# Patient Record
Sex: MALE | Race: WHITE | NOT HISPANIC OR LATINO
[De-identification: names, ages, dates, MRNs, and addresses within clinical notes are randomized per-mention and may not be internally consistent; named-entity substitution may affect disease eponyms.]

---

## 2021-09-13 ENCOUNTER — DASHBOARD ENCOUNTERS (OUTPATIENT)
Age: 86
End: 2021-09-13

## 2021-09-23 ENCOUNTER — OFFICE VISIT (OUTPATIENT)
Dept: URBAN - METROPOLITAN AREA CLINIC 54 | Facility: CLINIC | Age: 86
End: 2021-09-23
Payer: MEDICARE

## 2021-09-23 DIAGNOSIS — D30.00 ONCOCYTOMA DETERMINED BY BIOPSY OF KIDNEY: ICD-10-CM

## 2021-09-23 DIAGNOSIS — R14.2 BELCHING SYMPTOM: ICD-10-CM

## 2021-09-23 DIAGNOSIS — R94.5 ABNORMAL LFTS: ICD-10-CM

## 2021-09-23 DIAGNOSIS — R50.9 FEVER AND CHILLS: ICD-10-CM

## 2021-09-23 DIAGNOSIS — D18.03 HEPATIC HEMANGIOMA: ICD-10-CM

## 2021-09-23 DIAGNOSIS — R14.0 ABDOMINAL BLOATING: ICD-10-CM

## 2021-09-23 DIAGNOSIS — D50.8 ACQUIRED IRON DEFICIENCY ANEMIA DUE TO DECREASED ABSORPTION: ICD-10-CM

## 2021-09-23 DIAGNOSIS — R10.11 RUQ DISCOMFORT: ICD-10-CM

## 2021-09-23 PROCEDURE — 99244 OFF/OP CNSLTJ NEW/EST MOD 40: CPT | Performed by: INTERNAL MEDICINE

## 2021-09-23 PROCEDURE — 99204 OFFICE O/P NEW MOD 45 MIN: CPT | Performed by: INTERNAL MEDICINE

## 2021-09-23 RX ORDER — CETIRIZINE HYDROCHLORIDE 10 MG/1
1 TABLET TABLET, FILM COATED ORAL ONCE A DAY
Status: ACTIVE | COMMUNITY

## 2021-09-23 RX ORDER — ASPIRIN 325 MG/1
1 TABLET TABLET, FILM COATED ORAL ONCE A DAY
Status: ACTIVE | COMMUNITY

## 2021-09-23 RX ORDER — DEXLANSOPRAZOLE 60 MG/1
TAKE 1 CAPSULE (60 MG) BY ORAL ROUTE ONCE DAILY CAPSULE, DELAYED RELEASE ORAL 1
Qty: 0 | Refills: 0 | Status: ACTIVE | COMMUNITY
Start: 1900-01-01

## 2021-09-23 RX ORDER — SIMVASTATIN 80 MG/1
TAKE 1 TABLET BY ORAL ROUTE DAILY TABLET, FILM COATED ORAL 1
Qty: 0 | Refills: 0 | Status: ACTIVE | COMMUNITY
Start: 1900-01-01

## 2021-09-23 RX ORDER — LEVOTHYROXINE SODIUM 75 UG/1
TAKE 1 TABLET (75 MCG) BY ORAL ROUTE ONCE DAILY TABLET ORAL 1
Qty: 0 | Refills: 0 | Status: ACTIVE | COMMUNITY
Start: 1900-01-01

## 2021-09-23 RX ORDER — FOLIC ACID 1 MG/1
TAKE 1 TABLET (1 MG) BY ORAL ROUTE ONCE DAILY TABLET ORAL 1
Qty: 0 | Refills: 0 | Status: ACTIVE | COMMUNITY
Start: 1900-01-01

## 2021-09-23 RX ORDER — HYDROCHLOROTHIAZIDE 12.5 MG/1
TAKE 1 TABLET (12.5 MG) BY ORAL ROUTE ONCE DAILY TABLET ORAL 1
Qty: 0 | Refills: 0 | Status: ACTIVE | COMMUNITY
Start: 1900-01-01

## 2021-09-23 RX ORDER — PROMETHAZINE HYDROCHLORIDE 25 MG/1
1 TABLET AS NEEDED TABLET ORAL
Status: ACTIVE | COMMUNITY

## 2021-09-23 RX ORDER — CETIRIZINE HYDROCHLORIDE 10 MG/1
TAKE 1 TABLET (10 MG) BY ORAL ROUTE ONCE DAILY TABLET, FILM COATED ORAL 1
Qty: 0 | Refills: 0 | Status: DISCONTINUED | COMMUNITY
Start: 1900-01-01

## 2021-09-23 RX ORDER — METHOTREXATE 2.5 MG/1
4 TABLETS TABLET ORAL
Refills: 0 | Status: ACTIVE | COMMUNITY
Start: 1900-01-01

## 2021-09-23 NOTE — HPI-TODAY'S VISIT:
Patient is a 89 yo man referred by Dr. Peña Dorado for above reasons. A copy of this document will be sent to referring provider. He was in his usual state of health until end June 2021 when he developed sudden onset of chills, fever (100-101F) which persisted for a couple days. This was followed by nausea, vomting and diarrhea. He was seen by Dr. Dorado on 7/2 and underwent stool, blood testing along with RUQ USG and CT abdomen (records reviewed). No obvious etiology was found on above testing. He had abnormal LFT's (mixed hepatocellular) pattern without jaundice which returned to normal within 2 weeks. He was seen by Dr. Ward few years ago for abnormal LFT's. Serological workup was negative and liver biopsy was unremarkable. He continues to have bloating, gas, eructations and mild RUQ discomfort. Diarrhea has resolved. No recurrence of fever or chills. He denies recent travel, sick contacts or use of antibiotics. He had lap CC in 2017. 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 He takes Dexilant 60 mg po daily for GERD with good control.

## 2021-09-23 NOTE — PHYSICAL EXAM GASTROINTESTINAL
Abdomen , soft, nontender, nondistended , healed port scars,  no guarding or rigidity , no masses palpable , normal bowel sounds , Liver and Spleen , no hepatomegaly present , no hepatosplenomegaly , liver nontender , spleen not palpable

## 2021-09-24 LAB
A/G RATIO: 2
ALBUMIN: 4.5
ALKALINE PHOSPHATASE: 74
ALT (SGPT): 22
AST (SGOT): 22
BASO (ABSOLUTE): 0
BASOS: 0
BILIRUBIN, TOTAL: 0.4
BUN/CREATININE RATIO: 27
BUN: 26
CALCIUM: 9.5
CARBON DIOXIDE, TOTAL: 22
CHLORIDE: 103
CREATININE: 0.95
EGFR IF AFRICN AM: 79
EGFR IF NONAFRICN AM: 68
EOS (ABSOLUTE): 0.2
EOS: 3
GGT: 16
GLOBULIN, TOTAL: 2.3
GLUCOSE: 98
HEMATOCRIT: 49.1
HEMATOLOGY COMMENTS:: (no result)
HEMOGLOBIN: 16.2
IMMATURE CELLS: (no result)
IMMATURE GRANS (ABS): 0.1
IMMATURE GRANULOCYTES: 1
LYMPHS (ABSOLUTE): 1.6
LYMPHS: 20
MCH: 30.2
MCHC: 33
MCV: 92
MONOCYTES(ABSOLUTE): 1.1
MONOCYTES: 14
NEUTROPHILS (ABSOLUTE): 5
NEUTROPHILS: 62
NRBC: (no result)
PLATELETS: 227
POTASSIUM: 4.1
PROTEIN, TOTAL: 6.8
RBC: 5.36
RDW: 12.7
SODIUM: 140
WBC: 8

## 2021-10-04 ENCOUNTER — LAB OUTSIDE AN ENCOUNTER (OUTPATIENT)
Dept: URBAN - METROPOLITAN AREA CLINIC 54 | Facility: CLINIC | Age: 86
End: 2021-10-04

## 2021-10-05 LAB
CREATININE POC: 1
PERFORMING LAB: (no result)

## 2021-10-08 ENCOUNTER — TELEPHONE ENCOUNTER (OUTPATIENT)
Dept: URBAN - METROPOLITAN AREA CLINIC 92 | Facility: CLINIC | Age: 86
End: 2021-10-08